# Patient Record
Sex: MALE | Race: BLACK OR AFRICAN AMERICAN | Employment: FULL TIME | URBAN - METROPOLITAN AREA
[De-identification: names, ages, dates, MRNs, and addresses within clinical notes are randomized per-mention and may not be internally consistent; named-entity substitution may affect disease eponyms.]

---

## 2022-05-02 ENCOUNTER — HOSPITAL ENCOUNTER (EMERGENCY)
Age: 42
Discharge: HOME | End: 2022-05-03
Payer: COMMERCIAL

## 2022-05-02 ENCOUNTER — APPOINTMENT (OUTPATIENT)
Dept: GENERAL RADIOLOGY | Age: 42
End: 2022-05-02
Payer: COMMERCIAL

## 2022-05-02 DIAGNOSIS — M25.562 ARTHRALGIA OF BOTH KNEES: Primary | ICD-10-CM

## 2022-05-02 DIAGNOSIS — L03.115 CELLULITIS OF RIGHT LOWER EXTREMITY: ICD-10-CM

## 2022-05-02 DIAGNOSIS — M25.561 ARTHRALGIA OF BOTH KNEES: Primary | ICD-10-CM

## 2022-05-02 LAB
ALBUMIN SERPL-MCNC: 4.2 G/DL (ref 3.4–5)
ALP BLD-CCNC: 89 U/L (ref 40–129)
ALT SERPL-CCNC: 19 U/L (ref 10–40)
ANION GAP SERPL CALCULATED.3IONS-SCNC: 17 MMOL/L (ref 3–16)
AST SERPL-CCNC: 16 U/L (ref 15–37)
BASOPHILS ABSOLUTE: 0.1 K/UL (ref 0–0.2)
BASOPHILS RELATIVE PERCENT: 0.6 %
BILIRUB SERPL-MCNC: 0.5 MG/DL (ref 0–1)
BILIRUBIN DIRECT: <0.2 MG/DL (ref 0–0.3)
BILIRUBIN, INDIRECT: NORMAL MG/DL (ref 0–1)
BUN BLDV-MCNC: 34 MG/DL (ref 7–20)
CALCIUM SERPL-MCNC: 9.9 MG/DL (ref 8.3–10.6)
CHLORIDE BLD-SCNC: 98 MMOL/L (ref 99–110)
CO2: 24 MMOL/L (ref 21–32)
CREAT SERPL-MCNC: 3.6 MG/DL (ref 0.9–1.3)
EOSINOPHILS ABSOLUTE: 0.2 K/UL (ref 0–0.6)
EOSINOPHILS RELATIVE PERCENT: 1.4 %
GFR AFRICAN AMERICAN: 23
GFR NON-AFRICAN AMERICAN: 19
GLUCOSE BLD-MCNC: 101 MG/DL (ref 70–99)
HCT VFR BLD CALC: 32.8 % (ref 40.5–52.5)
HEMOGLOBIN: 11.1 G/DL (ref 13.5–17.5)
LACTIC ACID, SEPSIS: 1.2 MMOL/L (ref 0.4–1.9)
LYMPHOCYTES ABSOLUTE: 1.5 K/UL (ref 1–5.1)
LYMPHOCYTES RELATIVE PERCENT: 14.1 %
MCH RBC QN AUTO: 30.7 PG (ref 26–34)
MCHC RBC AUTO-ENTMCNC: 33.9 G/DL (ref 31–36)
MCV RBC AUTO: 90.6 FL (ref 80–100)
MONOCYTES ABSOLUTE: 0.8 K/UL (ref 0–1.3)
MONOCYTES RELATIVE PERCENT: 7.1 %
NEUTROPHILS ABSOLUTE: 8.3 K/UL (ref 1.7–7.7)
NEUTROPHILS RELATIVE PERCENT: 76.8 %
PDW BLD-RTO: 14.2 % (ref 12.4–15.4)
PLATELET # BLD: 298 K/UL (ref 135–450)
PMV BLD AUTO: 8.3 FL (ref 5–10.5)
POTASSIUM REFLEX MAGNESIUM: 3.8 MMOL/L (ref 3.5–5.1)
RAPID INFLUENZA  B AGN: NEGATIVE
RAPID INFLUENZA A AGN: NEGATIVE
RBC # BLD: 3.62 M/UL (ref 4.2–5.9)
SEDIMENTATION RATE, ERYTHROCYTE: 109 MM/HR (ref 0–15)
SODIUM BLD-SCNC: 139 MMOL/L (ref 136–145)
TOTAL PROTEIN: 7.9 G/DL (ref 6.4–8.2)
URIC ACID, SERUM: 10.8 MG/DL (ref 3.5–7.2)
WBC # BLD: 10.8 K/UL (ref 4–11)

## 2022-05-02 PROCEDURE — 87804 INFLUENZA ASSAY W/OPTIC: CPT

## 2022-05-02 PROCEDURE — 85652 RBC SED RATE AUTOMATED: CPT

## 2022-05-02 PROCEDURE — U0005 INFEC AGEN DETEC AMPLI PROBE: HCPCS

## 2022-05-02 PROCEDURE — 96374 THER/PROPH/DIAG INJ IV PUSH: CPT

## 2022-05-02 PROCEDURE — 84550 ASSAY OF BLOOD/URIC ACID: CPT

## 2022-05-02 PROCEDURE — 2580000003 HC RX 258: Performed by: PHYSICIAN ASSISTANT

## 2022-05-02 PROCEDURE — 96375 TX/PRO/DX INJ NEW DRUG ADDON: CPT

## 2022-05-02 PROCEDURE — 99284 EMERGENCY DEPT VISIT MOD MDM: CPT

## 2022-05-02 PROCEDURE — 83605 ASSAY OF LACTIC ACID: CPT

## 2022-05-02 PROCEDURE — 80076 HEPATIC FUNCTION PANEL: CPT

## 2022-05-02 PROCEDURE — 71045 X-RAY EXAM CHEST 1 VIEW: CPT

## 2022-05-02 PROCEDURE — 87040 BLOOD CULTURE FOR BACTERIA: CPT

## 2022-05-02 PROCEDURE — 86140 C-REACTIVE PROTEIN: CPT

## 2022-05-02 PROCEDURE — 80048 BASIC METABOLIC PNL TOTAL CA: CPT

## 2022-05-02 PROCEDURE — 96361 HYDRATE IV INFUSION ADD-ON: CPT

## 2022-05-02 PROCEDURE — 6360000002 HC RX W HCPCS: Performed by: PHYSICIAN ASSISTANT

## 2022-05-02 PROCEDURE — 36415 COLL VENOUS BLD VENIPUNCTURE: CPT

## 2022-05-02 PROCEDURE — 85025 COMPLETE CBC W/AUTO DIFF WBC: CPT

## 2022-05-02 PROCEDURE — 81001 URINALYSIS AUTO W/SCOPE: CPT

## 2022-05-02 PROCEDURE — 6370000000 HC RX 637 (ALT 250 FOR IP): Performed by: PHYSICIAN ASSISTANT

## 2022-05-02 PROCEDURE — U0003 INFECTIOUS AGENT DETECTION BY NUCLEIC ACID (DNA OR RNA); SEVERE ACUTE RESPIRATORY SYNDROME CORONAVIRUS 2 (SARS-COV-2) (CORONAVIRUS DISEASE [COVID-19]), AMPLIFIED PROBE TECHNIQUE, MAKING USE OF HIGH THROUGHPUT TECHNOLOGIES AS DESCRIBED BY CMS-2020-01-R: HCPCS

## 2022-05-02 RX ORDER — METHYLPREDNISOLONE 4 MG/1
TABLET ORAL
Qty: 1 KIT | Refills: 0 | Status: SHIPPED | OUTPATIENT
Start: 2022-05-02 | End: 2022-06-24

## 2022-05-02 RX ORDER — CEPHALEXIN 500 MG/1
500 CAPSULE ORAL 2 TIMES DAILY
Qty: 14 CAPSULE | Refills: 0 | Status: SHIPPED | OUTPATIENT
Start: 2022-05-02 | End: 2022-05-09

## 2022-05-02 RX ORDER — MORPHINE SULFATE 4 MG/ML
4 INJECTION, SOLUTION INTRAMUSCULAR; INTRAVENOUS ONCE
Status: COMPLETED | OUTPATIENT
Start: 2022-05-02 | End: 2022-05-02

## 2022-05-02 RX ORDER — ACETAMINOPHEN 500 MG
1000 TABLET ORAL ONCE
Status: COMPLETED | OUTPATIENT
Start: 2022-05-02 | End: 2022-05-02

## 2022-05-02 RX ORDER — 0.9 % SODIUM CHLORIDE 0.9 %
1000 INTRAVENOUS SOLUTION INTRAVENOUS ONCE
Status: COMPLETED | OUTPATIENT
Start: 2022-05-02 | End: 2022-05-02

## 2022-05-02 RX ORDER — ONDANSETRON 2 MG/ML
4 INJECTION INTRAMUSCULAR; INTRAVENOUS ONCE
Status: COMPLETED | OUTPATIENT
Start: 2022-05-02 | End: 2022-05-02

## 2022-05-02 RX ADMIN — SODIUM CHLORIDE 1000 ML: 9 INJECTION, SOLUTION INTRAVENOUS at 21:33

## 2022-05-02 RX ADMIN — MORPHINE SULFATE 4 MG: 4 INJECTION, SOLUTION INTRAMUSCULAR; INTRAVENOUS at 21:34

## 2022-05-02 RX ADMIN — ACETAMINOPHEN 1000 MG: 500 TABLET ORAL at 21:36

## 2022-05-02 RX ADMIN — ONDANSETRON 4 MG: 2 INJECTION INTRAMUSCULAR; INTRAVENOUS at 21:34

## 2022-05-02 ASSESSMENT — PAIN DESCRIPTION - LOCATION
LOCATION: KNEE
LOCATION: KNEE

## 2022-05-02 ASSESSMENT — PAIN SCALES - GENERAL
PAINLEVEL_OUTOF10: 2
PAINLEVEL_OUTOF10: 1
PAINLEVEL_OUTOF10: 8
PAINLEVEL_OUTOF10: 8

## 2022-05-02 ASSESSMENT — PAIN DESCRIPTION - ORIENTATION
ORIENTATION: RIGHT;LEFT
ORIENTATION: LEFT;RIGHT

## 2022-05-02 ASSESSMENT — PAIN - FUNCTIONAL ASSESSMENT: PAIN_FUNCTIONAL_ASSESSMENT: 0-10

## 2022-05-03 VITALS
HEART RATE: 94 BPM | TEMPERATURE: 99.3 F | DIASTOLIC BLOOD PRESSURE: 76 MMHG | RESPIRATION RATE: 14 BRPM | BODY MASS INDEX: 31.54 KG/M2 | HEIGHT: 71 IN | OXYGEN SATURATION: 98 % | WEIGHT: 225.3 LBS | SYSTOLIC BLOOD PRESSURE: 120 MMHG

## 2022-05-03 LAB
BACTERIA: ABNORMAL /HPF
BILIRUBIN URINE: NEGATIVE
BLOOD, URINE: NEGATIVE
C-REACTIVE PROTEIN: 162.7 MG/L (ref 0–5.1)
CLARITY: CLEAR
COLOR: YELLOW
EPITHELIAL CELLS, UA: ABNORMAL /HPF (ref 0–5)
GLUCOSE URINE: NEGATIVE MG/DL
KETONES, URINE: NEGATIVE MG/DL
LEUKOCYTE ESTERASE, URINE: NEGATIVE
MICROSCOPIC EXAMINATION: YES
MUCUS: ABNORMAL /LPF
NITRITE, URINE: NEGATIVE
PH UA: 5.5 (ref 5–8)
PROTEIN UA: ABNORMAL MG/DL
RBC UA: ABNORMAL /HPF (ref 0–4)
SARS-COV-2, PCR: NOT DETECTED
SPECIFIC GRAVITY UA: 1.02 (ref 1–1.03)
URINE REFLEX TO CULTURE: ABNORMAL
URINE TYPE: ABNORMAL
UROBILINOGEN, URINE: 0.2 E.U./DL
WBC UA: ABNORMAL /HPF (ref 0–5)

## 2022-05-03 ASSESSMENT — PAIN SCALES - GENERAL: PAINLEVEL_OUTOF10: 1

## 2022-05-03 ASSESSMENT — PAIN - FUNCTIONAL ASSESSMENT: PAIN_FUNCTIONAL_ASSESSMENT: 0-10

## 2022-05-03 NOTE — ED PROVIDER NOTES
MidLevel Attestation   I havepersonally performed and/or participated in the history, exam and medical decision making and agree with all pertinent clinical information. I have also reviewed and agree with the past medical, family and social historyunless otherwise noted. I have personally performed a face to face diagnostic evaluation onthis patient. I have reviewed the mid-levels findings and agree. In brief, Jaime Armstrong is a 46 y.o. male that presented to the emergency department with   Chief Complaint   Patient presents with    Joint Pain     c/o bilateral knee pain. Left knee x 3 weeks. Right knee since friday. Also c/o back pain since sunday. .  CONSTITUTIONAL: Well appearing, in no acute distress   EYES: PERRL, No injection, discharge or scleral icterus. NECK: Normal ROM, NO LAD and Moist mucous membranes. CARDIOVASCULAR: Regular rate and rhythm. No murmurs or gallop. PULMONARY/CHEST: Airway patent. No retractions. Breath sounds clear with good air entry bilaterally. ABDOMEN: Soft, Non-distended and non-tender, without guarding or rebound. SKIN: Acyanotic, warm, dry   MUSCULOSKELETAL: Right knee was warm and tender to touch concerning for cellulitis  NEUROLOGICAL: Awake. Pulses intact. Grossly nonfocal     Who is presenting complaining of joint pain on both sides for the past 3 weeks with a fever. Patient stated that his symptoms have been similar to the past when he was diagnosed with pseudogout and not at that he had a fever. Patient presented with a fever and tachycardic. Septic protocol was initiated. Patient was treated for fever with IV fluids. Labs obtained with no significant findings no concerns of systemic infection. I believe that there is probably some inflammatory changes which could be gout versus pseudogout. Nonetheless patient is stable at this time no indication for admission.   He was given antibiotics for possible cellulitis of the knee because of the redness and warmth to touch and steroids for possible gout. Patient was discharged home in stable condition.     I have reviewed and interpreted all of the currently available lab results and diagnostics from this visit:  Results for orders placed or performed during the hospital encounter of 05/02/22   Rapid influenza A/B antigens    Specimen: Nasopharyngeal   Result Value Ref Range    Rapid Influenza A Ag Negative Negative    Rapid Influenza B Ag Negative Negative   CBC with Auto Differential   Result Value Ref Range    WBC 10.8 4.0 - 11.0 K/uL    RBC 3.62 (L) 4.20 - 5.90 M/uL    Hemoglobin 11.1 (L) 13.5 - 17.5 g/dL    Hematocrit 32.8 (L) 40.5 - 52.5 %    MCV 90.6 80.0 - 100.0 fL    MCH 30.7 26.0 - 34.0 pg    MCHC 33.9 31.0 - 36.0 g/dL    RDW 14.2 12.4 - 15.4 %    Platelets 031 959 - 241 K/uL    MPV 8.3 5.0 - 10.5 fL    Neutrophils % 76.8 %    Lymphocytes % 14.1 %    Monocytes % 7.1 %    Eosinophils % 1.4 %    Basophils % 0.6 %    Neutrophils Absolute 8.3 (H) 1.7 - 7.7 K/uL    Lymphocytes Absolute 1.5 1.0 - 5.1 K/uL    Monocytes Absolute 0.8 0.0 - 1.3 K/uL    Eosinophils Absolute 0.2 0.0 - 0.6 K/uL    Basophils Absolute 0.1 0.0 - 0.2 K/uL   Basic Metabolic Panel w/ Reflex to MG   Result Value Ref Range    Sodium 139 136 - 145 mmol/L    Potassium reflex Magnesium 3.8 3.5 - 5.1 mmol/L    Chloride 98 (L) 99 - 110 mmol/L    CO2 24 21 - 32 mmol/L    Anion Gap 17 (H) 3 - 16    Glucose 101 (H) 70 - 99 mg/dL    BUN 34 (H) 7 - 20 mg/dL    CREATININE 3.6 (H) 0.9 - 1.3 mg/dL    GFR Non- 19 (A) >60    GFR  23 (A) >60    Calcium 9.9 8.3 - 10.6 mg/dL   Uric Acid   Result Value Ref Range    Uric Acid, Serum 10.8 (H) 3.5 - 7.2 mg/dL   Urinalysis with Reflex to Culture    Specimen: Urine, clean catch   Result Value Ref Range    Color, UA Yellow Straw/Yellow    Clarity, UA Clear Clear    Glucose, Ur Negative Negative mg/dL    Bilirubin Urine Negative Negative    Ketones, Urine Negative Negative mg/dL Specific Gravity, UA 1.020 1.005 - 1.030    Blood, Urine Negative Negative    pH, UA 5.5 5.0 - 8.0    Protein, UA TRACE (A) Negative mg/dL    Urobilinogen, Urine 0.2 <2.0 E.U./dL    Nitrite, Urine Negative Negative    Leukocyte Esterase, Urine Negative Negative    Microscopic Examination YES     Urine Type NotGiven     Urine Reflex to Culture Not Indicated    Sedimentation Rate   Result Value Ref Range    Sed Rate 109 (H) 0 - 15 mm/Hr   C-Reactive Protein   Result Value Ref Range    .7 (H) 0.0 - 5.1 mg/L   Hepatic Function Panel   Result Value Ref Range    Total Protein 7.9 6.4 - 8.2 g/dL    Albumin 4.2 3.4 - 5.0 g/dL    Alkaline Phosphatase 89 40 - 129 U/L    ALT 19 10 - 40 U/L    AST 16 15 - 37 U/L    Total Bilirubin 0.5 0.0 - 1.0 mg/dL    Bilirubin, Direct <0.2 0.0 - 0.3 mg/dL    Bilirubin, Indirect see below 0.0 - 1.0 mg/dL   Lactate, Sepsis   Result Value Ref Range    Lactic Acid, Sepsis 1.2 0.4 - 1.9 mmol/L   Microscopic Urinalysis   Result Value Ref Range    Mucus, UA Rare (A) None Seen /LPF    WBC, UA 0-2 0 - 5 /HPF    RBC, UA 0-2 0 - 4 /HPF    Epithelial Cells, UA 0-1 0 - 5 /HPF    Bacteria, UA 2+ (A) None Seen /HPF   COVID-19   Result Value Ref Range    SARS-CoV-2, PCR Not Detected Not Detected     No results found.     ED Medication Orders (From admission, onward)    Start Ordered     Status Ordering Provider    05/02/22 2100 05/02/22 2050  0.9 % sodium chloride bolus  ONCE         Last MAR action: Stopped - by CRISTIAN RUTLEDGE on 05/02/22 at [address] C    05/02/22 2100 05/02/22 2050  acetaminophen (TYLENOL) tablet 1,000 mg  ONCE         Last MAR action: Given - by Joaquin Villegas on 05/02/22 at [address] C    05/02/22 2100 05/02/22 2054  morphine (PF) injection 4 mg  ONCE         Last MAR action: Given - by Joaquin Villegas on 05/02/22 at [address]    05/02/22 2100 05/02/22 2054  ondansetron (ZOFRAN) injection 4 mg  ONCE         Last MAR action: Given - by LAUREN, Brenden Ashby on 05/02/22 at [address]          Final Impression      1. Arthralgia of both knees    2. Cellulitis of right lower extremity        DISPOSITION Decision To Discharge 05/02/2022 11:53:22 PM       This record is transcribed utilizing voice recognition technology. There are inherent limitations in this technology. In addition, there may be limitations in editing of this report. If there are any discrepancies, please contact me directly.     Javier Dean MD   5/4/2022         Javier Dean MD  05/04/22 5969

## 2022-05-03 NOTE — ED PROVIDER NOTES
[facility] ENCOUNTER        Pt Name: Bebo Palomino  MRN: [id number]  [place] 1980  Date of evaluation: 5/2/2022  Provider: Chelsea Horvath PA-C  PCP: No primary care provider on file. Note Started: 9:06 PM EDT       I have seen and evaluated this patient with my supervising physician Dr. Sharon Montoya   Patient presents with    Joint Pain     c/o bilateral knee pain. Left knee x 3 weeks. Right knee since friday. Also c/o back pain since sunday. HISTORY OF PRESENT ILLNESS   (Location/Symptom, Timing/Onset, Context/Setting, Quality, Duration, Modifying Factors, Severity)  Note limiting factors. Chief Complaint: Knees hurting    Bebo Palomino is a 46 y.o. male who presents indicating that his left knee started hurting worse on Friday and then his right knee soon followed. Both of them are swollen and tender. He states that he might have gout because he has had it before in his toe. However he states that he has some other issues going on. He sees a cardiologist and has a history of heart disease. He has seen his family doctor and also has a history of advancing kidney disease. He has not seen the nephrologist yet. Really the left knee has been bothering him for a number of weeks at this time. He states that while the cardiologist started him on a diuretic, he also did discontinue it. The family doctor refilled it but since the cardiologist was concerned about his kidney function the patient did not started again. He states that he has been using a \"natural\" kidney cleanser that one of his friends indicated to him should be okay in spite of his kidney disease.   Patient states that he very frequently over the course of the last several months has had night sweats and really has not noticed the fever today as being anything different than usual.  He denies any real runny or stuffy nose or cough or congestion or abdominal pain or burning in urination or acute stool change or extremity acute weakness. He states his knees really are the worst issues on the right 1 is worse with bending it and better at rest achy constant 8 out of 10. Patient's mother, also at patient's bedside, indicates that he has been moving recently and in and out of a moving truck which may have gotten things aggravated as well. Patient is not currently on any medication for gout and is not on a diuretic. He has not been immunized for influenza or COVID at all. Nursing Notes were all reviewed and agreed with or any disagreements were addressed in the HPI. REVIEW OF SYSTEMS    (2-9 systems for level 4, 10 or more for level 5)     Review of Systems  Positive for the night sweats which patient states are not atypical or abnormal for him at this point but no noticed fevers at home, generally feeling worse over the last 3 days or so since Friday with increasing swelling and pain and tightness in the bilateral knees first the left and then more into the right at this time with no extremity acute weakness or loss of sensation or coordination. Positive for potential overuse type injuries aggravating the knees with moving recently and using a moving truck. No shortness of breath or chest pain or heart palpitations dizziness confusion syncope or near syncope. No abdominal pain or distention or difficulty eating or drinking. No acute urine or stool changes noted by patient and her headache or neck pain or stiffness. PAST MEDICAL HISTORY   History reviewed. No pertinent past medical history. SURGICAL HISTORY   History reviewed. No pertinent surgical history. CURRENTMEDICATIONS       Previous Medications    No medications on file       ALLERGIES     Patient has no known allergies. FAMILYHISTORY     History reviewed. No pertinent family history.      SOCIAL HISTORY       Social History     Socioeconomic History    Marital status: Single     Spouse name: None    Number of children: None    Years of education: None    Highest education level: None   Occupational History    None   Tobacco Use    Smoking status: Never Smoker    Smokeless tobacco: None   Substance and Sexual Activity    Alcohol use: Never    Drug use: None    Sexual activity: None   Other Topics Concern    None   Social History Narrative    None     Social Determinants of Health     Financial Resource Strain:     Difficulty of Paying Living Expenses: Not on file   Food Insecurity:     Worried About Running Out of Food in the Last Year: Not on file    Marilee of Food in the Last Year: Not on file   Transportation Needs:     Lack of Transportation (Medical): Not on file    Lack of Transportation (Non-Medical):  Not on file   Physical Activity:     Days of Exercise per Week: Not on file    Minutes of Exercise per Session: Not on file   Stress:     Feeling of Stress : Not on file   Social Connections:     Frequency of Communication with Friends and Family: Not on file    Frequency of Social Gatherings with Friends and Family: Not on file    Attends Christian Services: Not on file    Active Member of [address] [place] [address] or Organizations: Not on file    Attends Club or Organization Meetings: Not on file    Marital Status: Not on file   Intimate Partner Violence:     Fear of Current or Ex-Partner: Not on file    Emotionally Abused: Not on file    Physically Abused: Not on file    Sexually Abused: Not on file   Housing Stability:     Unable to Pay for Housing in the Last Year: Not on file    Number of [place] in the Last Year: Not on file    Unstable Housing in the Last Year: Not on file       SCREENINGS    Rissa Coma Scale  Eye Opening: Spontaneous  Best Verbal Response: Oriented  Best Motor Response: Obeys commands  Rissa Coma Scale Score: 15        PHYSICAL EXAM    (up to 7 for level 4, 8 or more for level 5)     ED Triage Vitals [05/02/22 2025]   BP Temp Temp Source Pulse Resp SpO2 Height Weight   137/83 101.2 °F (38.4 °C) Oral 116 18 98 % 5' 11\" (1.803 m) 225 lb 4.8 oz (102.2 kg)       Physical Exam  Vitals and nursing note reviewed. Constitutional:       Appearance: Normal appearance. He is not diaphoretic. Comments: Patient does not look particularly stressed, cracking jokes at the bedside and giving his history well   HENT:      Head: Normocephalic and atraumatic. Right Ear: External ear normal.      Left Ear: External ear normal.      Nose: Nose normal.      Mouth/Throat:      Mouth: Mucous membranes are moist.      Comments: Gag reflex intact  Eyes:      General:         Right eye: No discharge. Left eye: No discharge. Conjunctiva/sclera: Conjunctivae normal.   Cardiovascular:      Rate and Rhythm: Regular rhythm. Tachycardia present. Pulses: Normal pulses. Heart sounds: Normal heart sounds. No murmur heard. No gallop. Pulmonary:      Effort: Pulmonary effort is normal. No respiratory distress. Breath sounds: Normal breath sounds. No wheezing, rhonchi or rales. Abdominal:      General: Bowel sounds are normal. There is no distension. Palpations: Abdomen is soft. Tenderness: There is no abdominal tenderness. There is no guarding or rebound. Musculoskeletal:         General: Swelling and tenderness present. Cervical back: Normal range of motion and neck supple. Right lower leg: No edema. Left lower leg: Edema present. Comments: Right and left knees both mildly edematous, not globally hot or red comparatively, however tender with movement and better at rest.  Distal pulses 2+ bilaterally in radialis as well as dorsalis pedis. Capillary fill brisk less than 1 second throughout. Skin:     General: Skin is warm and dry. Capillary Refill: Capillary refill takes less than 2 seconds. Findings: No rash.    Neurological:      Mental Status: He is alert and oriented to person, place, and time. Mental status is at baseline. Motor: No weakness. Coordination: Coordination normal.   Psychiatric:         Mood and Affect: Mood normal.         Behavior: Behavior normal.         DIAGNOSTIC RESULTS   LABS:    Labs Reviewed   RAPID INFLUENZA A/B ANTIGENS   CULTURE, BLOOD 1   CULTURE, BLOOD 2   COVID-19   CBC WITH AUTO DIFFERENTIAL   BASIC METABOLIC PANEL W/ REFLEX TO MG FOR LOW K   URIC ACID   URINALYSIS WITH REFLEX TO CULTURE   COVID-19   COVID-19   COVID-19   SEDIMENTATION RATE   C-REACTIVE PROTEIN   HEPATIC FUNCTION PANEL   LACTATE, SEPSIS   LACTATE, SEPSIS       When ordered, only abnormal lab results are displayed. All other labs were within normal range or not returned as of this dictation. EKG: When ordered, EKG's are interpreted by the Emergency Department Physician in the absence of a cardiologist.  Please see their note for interpretation of EKG. RADIOLOGY:   Non-plain film images such as CT, Ultrasound and MRI are read by the radiologist. Plain radiographic images are visualized andpreliminarily interpreted by the  ED Provider with the below findings:        Interpretation perthe Radiologist below, if available at the time of this note:    XR CHEST PORTABLE    (Results Pending)     No results found.       PROCEDURES   Unless otherwise noted below, none     Procedures    CRITICAL CARE TIME   N/A    CONSULTS:  None      EMERGENCY DEPARTMENT COURSE and DIFFERENTIAL DIAGNOSIS/MDM:   Vitals:    Vitals:    05/02/22 2025   BP: 137/83   Pulse: 116   Resp: 18   Temp: 101.2 °F (38.4 °C)   TempSrc: Oral   SpO2: 98%   Weight: 225 lb 4.8 oz (102.2 kg)   Height: 5' 11\" (1.803 m)       Patient was given thefollowing medications:  Medications   0.9 % sodium chloride bolus (has no administration in time range)   acetaminophen (TYLENOL) tablet 1,000 mg (has no administration in time range)   morphine (PF) injection 4 mg (has no administration in time range)   ondansetron [facility]) injection 4 mg (has no administration in time range)         This patient presents as above and evaluation and treatment is begun. It is discussed the patient that with his fever and tachycardia, history of heart disease and CHF, reported gout, and nonimmunized status in terms of COVID and flow, that there are a lot of caution signs for him and that some IV medications are indicated as well as work-up to begin. He indicates he is willing to do this. Initial testing when compared to patient's baseline kidney function shows BUN and creatinine 34 and 3.6 today whereas about 2 months ago the numbers were 30 and 3.7, very similar. Patient is given a bolus over 2 hours of a liter of normal saline in hopes of beginning to address his suspected dehydration. Uric acid level for the patient is elevated at 10.8. Lactic acid initially not elevated. Sed rate is 109. Rapid flu is negative. CBC without leukocytosis. 10:19 PM  Patient's work-up and treatment is still in process. Dr. Lisy Santamaria has been the supervising physician, has evaluated the patient and contributed to work-up to this point, and is continuing to provide care and treatment for the patient. Please see his notes for further course in the emergency department as well as final impression and disposition.   Thank you           (Please note that portions ofthis note were completed with a voice recognition program.  Efforts were made to edit the dictations but occasionally words are mis-transcribed.)    Sade Swan PA-C (electronically signed)             Sade Swan PA-C  05/02/22 2607

## 2022-05-07 LAB
BLOOD CULTURE, ROUTINE: NORMAL
CULTURE, BLOOD 2: NORMAL

## 2022-05-11 ENCOUNTER — OFFICE VISIT (OUTPATIENT)
Dept: ORTHOPEDIC SURGERY | Age: 42
End: 2022-05-11
Payer: COMMERCIAL

## 2022-05-11 VITALS — HEIGHT: 71 IN | BODY MASS INDEX: 31.5 KG/M2 | WEIGHT: 225 LBS

## 2022-05-11 DIAGNOSIS — M17.0 PRIMARY OSTEOARTHRITIS OF BOTH KNEES: Primary | ICD-10-CM

## 2022-05-11 DIAGNOSIS — R52 PAIN: ICD-10-CM

## 2022-05-11 PROCEDURE — 99203 OFFICE O/P NEW LOW 30 MIN: CPT | Performed by: ORTHOPAEDIC SURGERY

## 2022-05-11 PROCEDURE — 20610 DRAIN/INJ JOINT/BURSA W/O US: CPT | Performed by: ORTHOPAEDIC SURGERY

## 2022-05-11 RX ORDER — TRIAMCINOLONE ACETONIDE 40 MG/ML
40 INJECTION, SUSPENSION INTRA-ARTICULAR; INTRAMUSCULAR ONCE
Status: DISCONTINUED | OUTPATIENT
Start: 2022-05-11 | End: 2022-06-24

## 2022-05-11 RX ORDER — LIDOCAINE HYDROCHLORIDE 10 MG/ML
40 INJECTION, SOLUTION INFILTRATION; PERINEURAL ONCE
Status: DISCONTINUED | OUTPATIENT
Start: 2022-05-11 | End: 2022-06-24

## 2022-05-11 NOTE — PROGRESS NOTES
CHIEF COMPLAINT: Bilateral knee pain/ osteoarthritis. HISTORY:  Mr. Gina Olivo 46 y.o.  male presents today for the first visit for evaluation of bilateral knee pain which started to worsen in April 2022 with no injury or trauma. He is complaining of achy pain. Pain is increase with standing and walking and decrease with rest.  Rates pain a 6/10 VAS but does increase to a 9/10 VAS when walking. Pain is sharp early in the morning with first few steps, dull achy pain by the end of the day. Alleviating factors: rest. No radiation and no numbness and tingling sensation. No other complaint. He states he is recently lost more than 120 pounds over the past year. He is a type II diabetic. No h/o trauma or gout. He was recently in [facility] with complaints of bilateral knee pain and was referred to orthopedics. Denies smoking. History reviewed. No pertinent past medical history. History reviewed. No pertinent surgical history. Social History     Socioeconomic History    Marital status: Single     Spouse name: Not on file    Number of children: Not on file    Years of education: Not on file    Highest education level: Not on file   Occupational History    Not on file   Tobacco Use    Smoking status: Never Smoker    Smokeless tobacco: Never Used   Vaping Use    Vaping Use: Never used   Substance and Sexual Activity    Alcohol use: Never    Drug use: Never    Sexual activity: Not on file   Other Topics Concern    Not on file   Social History Narrative    Not on file     Social Determinants of Health     Financial Resource Strain:     Difficulty of Paying Living Expenses: Not on file   Food Insecurity:     Worried About [address] in the Last Year: Not on file    Marilee of Food in the Last Year: Not on file   Transportation Needs:     Lack of Transportation (Medical): Not on file    Lack of Transportation (Non-Medical):  Not on file   Physical Activity:     Days of Exercise per Week: Not on file    Minutes of Exercise per Session: Not on file   Stress:     Feeling of Stress : Not on file   Social Connections:     Frequency of Communication with Friends and Family: Not on file    Frequency of Social Gatherings with Friends and Family: Not on file    Attends Sikhism Services: Not on file    Active Member of Clubs or Organizations: Not on file    Attends Club or Organization Meetings: Not on file    Marital Status: Not on file   Intimate Partner Violence:     Fear of Current or Ex-Partner: Not on file    Emotionally Abused: Not on file    Physically Abused: Not on file    Sexually Abused: Not on file   Housing Stability:     Unable to Pay for Housing in the Last Year: Not on file    Number of [place] in the Last Year: Not on file    Unstable Housing in the Last Year: Not on file       History reviewed. No pertinent family history. Current Outpatient Medications on File Prior to Visit   Medication Sig Dispense Refill    methylPREDNISolone (MEDROL, JONATAN,) 4 MG tablet Take by mouth. (Patient not taking: Reported on 5/11/2022) 1 kit 0     No current facility-administered medications on file prior to visit. Pertinent items are noted in HPI  Review of systems reviewed from Patient History Form and available in the patient's chart under the Media tab. PHYSICAL EXAMINATION:  Mr. Anitha Kirkland is a very pleasant 46 y.o.  male who presents today in no acute distress, awake, alert, and oriented. He is well dressed, nourished and  groomed. Patient with normal affect. Height is  5' 11\" (1.803 m), weight is 225 lb (102.1 kg), Body mass index is 31.38 kg/m². Resting respiratory rate is 16. Examination of the gait, showed that the patient walks heel-toe with a non-antalgic gait and no limp.   Examination of both knees showing full ROM, bilateral mild crepitus, tenderness on medial joint line, stable to varus and valgus stress. He has intact sensation and good pedal pulses. He has good strength in 2 planes, and has mild tenderness on deep palpation over the medial joint line. Knee reflex 1+ bilaterally. IMAGING:  Xray 3 views of the bilateral knee was obtained today in the office and reviewed. These demonstrate moderate degenerative changes with narrowing of the joint space in the medial joint space compartment, subchondral sclerosis, and marginal osteophytosis. IMPRESSION: Bilateral knee DJD. PLAN: I discussed with the patient the treatment options including both surgical and non-surgical treatment. We recommended Quad exercises and stretching of the calf and hamstrings which was taught to the patient today. He will take NSAIDS as needed. I believe he will benefit from cortisone injection bilateral knee, and he agreed to have. PROCEDURE:    With the patient's permission, and under sterile condition, the bilateral knee was prepared and draped with alcohol and injected with a mixture of 1 mL Kenalog 40mg and 4 mL of 1% lidocaine through the medial parapatellar port area. The patient tolerated the procedure well. A band-aid was applied and the patient was advised to ice the knee for 15-20 minutes to relieve any injection site related pain. He reported a good improvement immediatly after the injection. F/u in 3-4 months, PT if needed. He understands that this may need surgery if the pain did not to resolve.        Too Briggs MD

## 2022-06-23 SDOH — HEALTH STABILITY: PHYSICAL HEALTH: ON AVERAGE, HOW MANY MINUTES DO YOU ENGAGE IN EXERCISE AT THIS LEVEL?: 0 MIN

## 2022-06-23 SDOH — HEALTH STABILITY: PHYSICAL HEALTH: ON AVERAGE, HOW MANY DAYS PER WEEK DO YOU ENGAGE IN MODERATE TO STRENUOUS EXERCISE (LIKE A BRISK WALK)?: 0 DAYS

## 2022-06-24 ENCOUNTER — OFFICE VISIT (OUTPATIENT)
Dept: PRIMARY CARE CLINIC | Age: 42
End: 2022-06-24
Payer: COMMERCIAL

## 2022-06-24 VITALS
HEART RATE: 103 BPM | TEMPERATURE: 97 F | WEIGHT: 232.2 LBS | SYSTOLIC BLOOD PRESSURE: 129 MMHG | BODY MASS INDEX: 32.51 KG/M2 | HEIGHT: 71 IN | DIASTOLIC BLOOD PRESSURE: 89 MMHG

## 2022-06-24 DIAGNOSIS — Z00.00 ANNUAL PHYSICAL EXAM: ICD-10-CM

## 2022-06-24 DIAGNOSIS — M10.9 ACUTE GOUT INVOLVING TOE OF RIGHT FOOT, UNSPECIFIED CAUSE: Primary | ICD-10-CM

## 2022-06-24 DIAGNOSIS — Z11.59 ENCOUNTER FOR HEPATITIS C SCREENING TEST FOR LOW RISK PATIENT: ICD-10-CM

## 2022-06-24 DIAGNOSIS — M10.9 ACUTE GOUT INVOLVING TOE OF RIGHT FOOT, UNSPECIFIED CAUSE: ICD-10-CM

## 2022-06-24 DIAGNOSIS — Z11.4 ENCOUNTER FOR SCREENING FOR HUMAN IMMUNODEFICIENCY VIRUS (HIV): ICD-10-CM

## 2022-06-24 DIAGNOSIS — Z95.810 IMPLANTABLE CARDIOVERTER-DEFIBRILLATOR (ICD) IN SITU: ICD-10-CM

## 2022-06-24 DIAGNOSIS — E87.6 HYPOKALEMIA: ICD-10-CM

## 2022-06-24 DIAGNOSIS — I10 BENIGN ESSENTIAL HYPERTENSION: ICD-10-CM

## 2022-06-24 DIAGNOSIS — I50.9 CHRONIC CONGESTIVE HEART FAILURE, UNSPECIFIED HEART FAILURE TYPE (HCC): ICD-10-CM

## 2022-06-24 DIAGNOSIS — E11.69 TYPE 2 DIABETES MELLITUS WITH OTHER SPECIFIED COMPLICATION, WITHOUT LONG-TERM CURRENT USE OF INSULIN (HCC): ICD-10-CM

## 2022-06-24 DIAGNOSIS — E66.09 CLASS 1 OBESITY DUE TO EXCESS CALORIES WITH SERIOUS COMORBIDITY IN ADULT, UNSPECIFIED BMI: ICD-10-CM

## 2022-06-24 PROBLEM — N19 RENAL FAILURE: Status: RESOLVED | Noted: 2022-03-08 | Resolved: 2022-06-24

## 2022-06-24 PROBLEM — G47.33 MODERATE OBSTRUCTIVE SLEEP APNEA: Status: ACTIVE | Noted: 2020-10-22

## 2022-06-24 PROBLEM — N19 RENAL FAILURE: Status: ACTIVE | Noted: 2022-03-08

## 2022-06-24 PROBLEM — E11.9 TYPE 2 DIABETES MELLITUS (HCC): Status: ACTIVE | Noted: 2018-01-01

## 2022-06-24 LAB
BASOPHILS ABSOLUTE: 0.1 K/UL (ref 0–0.2)
BASOPHILS RELATIVE PERCENT: 0.9 %
EOSINOPHILS ABSOLUTE: 0.1 K/UL (ref 0–0.6)
EOSINOPHILS RELATIVE PERCENT: 0.8 %
HCT VFR BLD CALC: 33 % (ref 40.5–52.5)
HEMOGLOBIN: 10.9 G/DL (ref 13.5–17.5)
LYMPHOCYTES ABSOLUTE: 2.7 K/UL (ref 1–5.1)
LYMPHOCYTES RELATIVE PERCENT: 23.3 %
MCH RBC QN AUTO: 29.5 PG (ref 26–34)
MCHC RBC AUTO-ENTMCNC: 33 G/DL (ref 31–36)
MCV RBC AUTO: 89.1 FL (ref 80–100)
MONOCYTES ABSOLUTE: 0.5 K/UL (ref 0–1.3)
MONOCYTES RELATIVE PERCENT: 4.5 %
NEUTROPHILS ABSOLUTE: 8.3 K/UL (ref 1.7–7.7)
NEUTROPHILS RELATIVE PERCENT: 70.5 %
PDW BLD-RTO: 15.7 % (ref 12.4–15.4)
PLATELET # BLD: 278 K/UL (ref 135–450)
PMV BLD AUTO: 8.5 FL (ref 5–10.5)
RBC # BLD: 3.71 M/UL (ref 4.2–5.9)
WBC # BLD: 11.7 K/UL (ref 4–11)

## 2022-06-24 PROCEDURE — 90746 HEPB VACCINE 3 DOSE ADULT IM: CPT | Performed by: STUDENT IN AN ORGANIZED HEALTH CARE EDUCATION/TRAINING PROGRAM

## 2022-06-24 PROCEDURE — 99204 OFFICE O/P NEW MOD 45 MIN: CPT | Performed by: STUDENT IN AN ORGANIZED HEALTH CARE EDUCATION/TRAINING PROGRAM

## 2022-06-24 PROCEDURE — 90732 PPSV23 VACC 2 YRS+ SUBQ/IM: CPT | Performed by: STUDENT IN AN ORGANIZED HEALTH CARE EDUCATION/TRAINING PROGRAM

## 2022-06-24 PROCEDURE — 90715 TDAP VACCINE 7 YRS/> IM: CPT | Performed by: STUDENT IN AN ORGANIZED HEALTH CARE EDUCATION/TRAINING PROGRAM

## 2022-06-24 PROCEDURE — 90471 IMMUNIZATION ADMIN: CPT | Performed by: STUDENT IN AN ORGANIZED HEALTH CARE EDUCATION/TRAINING PROGRAM

## 2022-06-24 PROCEDURE — 90472 IMMUNIZATION ADMIN EACH ADD: CPT | Performed by: STUDENT IN AN ORGANIZED HEALTH CARE EDUCATION/TRAINING PROGRAM

## 2022-06-24 RX ORDER — ATORVASTATIN CALCIUM 10 MG/1
10 TABLET, FILM COATED ORAL DAILY
COMMUNITY
Start: 2022-03-13

## 2022-06-24 RX ORDER — BUMETANIDE 1 MG/1
TABLET ORAL
COMMUNITY
Start: 2021-11-11 | End: 2022-06-24

## 2022-06-24 RX ORDER — POTASSIUM CHLORIDE 750 MG/1
10 TABLET, EXTENDED RELEASE ORAL DAILY
Qty: 30 TABLET | Refills: 11 | Status: SHIPPED | OUTPATIENT
Start: 2022-06-24

## 2022-06-24 RX ORDER — POTASSIUM CHLORIDE 750 MG/1
10 TABLET, EXTENDED RELEASE ORAL DAILY
Qty: 30 TABLET | Refills: 5
Start: 2022-06-24 | End: 2022-06-24 | Stop reason: SDUPTHER

## 2022-06-24 RX ORDER — BUMETANIDE 1 MG/1
TABLET ORAL
COMMUNITY
Start: 2022-04-27 | End: 2022-06-24 | Stop reason: SDUPTHER

## 2022-06-24 RX ORDER — CARVEDILOL 25 MG/1
TABLET ORAL
COMMUNITY
Start: 2022-03-08 | End: 2022-06-24

## 2022-06-24 RX ORDER — PREDNISONE 10 MG/1
TABLET ORAL
Qty: 40 TABLET | Refills: 0 | Status: SHIPPED | OUTPATIENT
Start: 2022-06-24 | End: 2022-07-10

## 2022-06-24 RX ORDER — CARVEDILOL 3.12 MG/1
TABLET ORAL
COMMUNITY
Start: 2022-05-25

## 2022-06-24 RX ORDER — ATORVASTATIN CALCIUM 10 MG/1
TABLET, FILM COATED ORAL
COMMUNITY
Start: 2022-05-25 | End: 2022-06-24

## 2022-06-24 RX ORDER — BUMETANIDE 1 MG/1
1 TABLET ORAL DAILY
Qty: 30 TABLET | Refills: 11 | Status: SHIPPED | OUTPATIENT
Start: 2022-06-24 | End: 2022-07-29 | Stop reason: SDUPTHER

## 2022-06-24 RX ORDER — BUMETANIDE 1 MG/1
TABLET ORAL
COMMUNITY
Start: 2022-04-27 | End: 2022-06-24

## 2022-06-24 ASSESSMENT — PATIENT HEALTH QUESTIONNAIRE - PHQ9
SUM OF ALL RESPONSES TO PHQ9 QUESTIONS 1 & 2: 0
1. LITTLE INTEREST OR PLEASURE IN DOING THINGS: 0
SUM OF ALL RESPONSES TO PHQ QUESTIONS 1-9: 0
SUM OF ALL RESPONSES TO PHQ QUESTIONS 1-9: 0
2. FEELING DOWN, DEPRESSED OR HOPELESS: 0
SUM OF ALL RESPONSES TO PHQ QUESTIONS 1-9: 0
SUM OF ALL RESPONSES TO PHQ QUESTIONS 1-9: 0

## 2022-06-24 ASSESSMENT — ENCOUNTER SYMPTOMS
NAUSEA: 0
WHEEZING: 0
SHORTNESS OF BREATH: 0

## 2022-06-24 NOTE — PROGRESS NOTES
[facility]  Establish care visit   2022    Aretha Wood (:  1980) is a 46 y.o. male, here to establish care. Chief Complaint   Patient presents with   [address]     wants bloodwork and urine test done         ASSESSMENT/ PLAN  1. Acute gout involving toe of right foot, unspecified cause  Uncontrolled, check labs. Initiate prednisone taper. Start allopurinol at follow-up appointment in 2 weeks. - CBC with Auto Differential; Future  - Uric Acid; Future  - Sedimentation Rate; Future  - C-Reactive Protein; Future  - predniSONE (DELTASONE) 10 MG tablet; Take 4 tablets by mouth daily for 4 days, THEN 3 tablets daily for 4 days, THEN 2 tablets daily for 4 days, THEN 1 tablet daily for 4 days. Dispense: 40 tablet; Refill: 0    2. Encounter for hepatitis C screening test for low risk patient  Screen  - Hepatitis C Antibody; Future    3. Encounter for screening for human immunodeficiency virus (HIV)  Screen  - HIV Screen; Future    4. Hypokalemia  Secondary to chronic Bumex use. Refilled  - potassium chloride (KLOR-CON M) 10 MEQ extended release tablet; Take 1 tablet by mouth daily  Dispense: 30 tablet; Refill: 11    5. Type 2 diabetes mellitus with other specified complication, without long-term current use of insulin (HCC)  Unknown A1c, recheck today. Continue Lipitor.  - MICROALBUMIN / CREATININE URINE RATIO; Future    6. Benign essential hypertension  Controlled, continue carvedilol    7. Class 1 obesity due to excess calories with serious comorbidity in adult, unspecified BMI  Complicates all aspects of patient's care    8. Chronic congestive heart failure, unspecified heart failure type ([address]  Unknown EF. Continue Bumex, carvedilol and Lipitor. Referral placed for cardiology  - Charlie Velásquez MD, Cardiology, [facility]  - bumetanide (BUMEX) 1 MG tablet; Take 1 tablet by mouth daily  Dispense: 30 tablet; Refill: 11    9.  Implantable cardioverter-defibrillator (ICD) in situ  Per above  - Jordan Palencia MD, Cardiology, [facility]    10. Annual physical exam  Screening labs ordered for follow-up physical  - Comprehensive Metabolic Panel; Future  - Hemoglobin A1C; Future  - Lipid Panel; Future       Return in about 2 weeks (around 7/8/2022) for gout, diabetes follow up. HPI  Patient presents today to establish care with concerns of gout, CHF with ICD placement, obesity, diabetes, hypertension, medication refills. Patient recently relocated from [place] to work for [employer]. Notes longstanding cardiac pathology starting with congenital cardiomegaly. He has followed closely with cardiology, last echocardiogram was last year. He cannot recall what his previous EF was, and record is not available in Care Everywhere. He has been adherent to his Bumex, carvedilol, and Lipitor. Denies chest pain, shortness of breath, palpitations or lower extremity swelling. States that he has had several gout attacks of his right first metatarsal, ankle. Most recent exacerbation began this week. He is not on gout prophylaxis. No injury to the area. Endorses weight loss with lifestyle changes, and has discontinued his metformin and Trulicity. Due for A1c recheck today. ROS  Review of Systems   Constitutional: Negative for activity change and unexpected weight change. HENT: Negative for tinnitus. Eyes: Negative for visual disturbance. Respiratory: Negative for shortness of breath and wheezing. Cardiovascular: Negative for chest pain, palpitations and leg swelling. Gastrointestinal: Negative for nausea. Genitourinary: Negative for decreased urine volume. Neurological: Negative for syncope, light-headedness and headaches.         HISTORIES  Current Outpatient Medications on File Prior to Visit   Medication Sig Dispense Refill    atorvastatin (LIPITOR) 10 MG tablet Take 10 mg by mouth daily      carvedilol (COREG) 3.125 MG tablet TAKE 1 TABLET BY MOUTH TWICE DAILY       No current facility-administered medications on file prior to visit. Allergies   Allergen Reactions    Eucalyptus Oil Rash       Past Medical History:   Diagnosis Date    Arthritis     Congestive heart failure (CHF) (HCC)     Diabetes (HCC)     Gout     HTN (hypertension)        Patient Active Problem List   Diagnosis    Primary osteoarthritis of both knees    Benign essential hypertension    Cardiomyopathy [facility]    CHF (congestive heart failure) (HCC)    Implantable cardioverter-defibrillator (ICD) in situ    Moderate obstructive sleep apnea    Type 2 diabetes mellitus ([facility])    Class 1 obesity due to excess calories with serious comorbidity in adult    Acute gout involving toe of right foot    Hypokalemia       Past Surgical History:   Procedure Laterality Date    EYE SURGERY         Social History     Socioeconomic History    Marital status: Single     Spouse name: Not on file    Number of children: Not on file    Years of education: Not on file    Highest education level: Not on file   Occupational History     Comment: MD [employer]   Tobacco Use    Smoking status: Never Smoker    Smokeless tobacco: Never Used   Vaping Use    Vaping Use: Never used   Substance and Sexual Activity    Alcohol use: Never    Drug use: Never    Sexual activity: Not Currently   Other Topics Concern    Not on file   Social History Narrative    Lives alone feels safe      Social Determinants of Health     Financial Resource Strain:     Difficulty of Paying Living Expenses: Not on file   Food Insecurity:     Worried About Running Out of Food in the Last Year: Not on file    Marilee of Food in the Last Year: Not on file   Transportation Needs:     Lack of Transportation (Medical): Not on file    Lack of Transportation (Non-Medical):  Not on file   Physical Activity: Inactive    Days of Exercise per Week: 0 days    Minutes of Exercise per Session: 0 min   Stress:     Feeling of Stress : Not on file   Social Connections:     Frequency of Communication with Friends and Family: Not on file    Frequency of Social Gatherings with Friends and Family: Not on file    Attends Mormonism Services: Not on file    Active Member of Clubs or Organizations: Not on file    Attends Club or Organization Meetings: Not on file    Marital Status: Not on file   Intimate Partner Violence: Not At Risk    Fear of Current or Ex-Partner: No    Emotionally Abused: No    Physically Abused: No    Sexually Abused: No   Housing Stability:     Unable to Pay for Housing in the Last Year: Not on file    Number of [place] in the Last Year: Not on file    Unstable Housing in the Last Year: Not on file        Family History   Problem Relation Age of Onset    Diabetes Mother     Diabetes Father     Diabetes Sister        PE  Vitals:    06/24/22 1012 06/24/22 1022   BP: (!) 146/89 129/89   Site: Right Upper Arm Left Upper Arm   Position: Sitting Sitting   Cuff Size: Large Adult Large Adult   Pulse: (!) 112 (!) 103   Temp: 97 °F (36.1 °C)    TempSrc: Temporal    Weight: 232 lb 3.2 oz (105.3 kg)    Height: 5' 11\" (1.803 m)      Estimated body mass index is 32.39 kg/m² as calculated from the following:    Height as of this encounter: 5' 11\" (1.803 m). Weight as of this encounter: 232 lb 3.2 oz (105.3 kg). Physical Exam  Vitals reviewed. Constitutional:       General: He is not in acute distress. Appearance: Normal appearance. He is obese. HENT:      Head: Normocephalic and atraumatic. Eyes:      Extraocular Movements: Extraocular movements intact. Pupils: Pupils are equal, round, and reactive to light. Cardiovascular:      Rate and Rhythm: Normal rate and regular rhythm. Pulses: Normal pulses. Heart sounds: Normal heart sounds. No murmur heard. No gallop. Pulmonary:      Effort: Pulmonary effort is normal.      Breath sounds: Normal breath sounds. No wheezing or rales. Abdominal:      General: Abdomen is flat. Palpations: Abdomen is soft. Musculoskeletal:         General: Swelling and tenderness present. Right lower leg: No edema. Left lower leg: No edema. Skin:     General: Skin is warm and dry. Neurological:      Mental Status: He is alert. Immunization History   Administered Date(s) Administered    Hepatitis B Adult (Engerix-B) 06/24/2022    Td, unspecified formulation 01/09/2013       Health Maintenance   Topic Date Due    COVID-19 Vaccine (1) Never done    Pneumococcal 0-64 years Vaccine (1 - PCV) Never done    Diabetic foot exam  Never done    A1C test (Diabetic or Prediabetic)  Never done    Lipids  Never done    HIV screen  Never done    Diabetic microalbuminuria test  Never done    Diabetic retinal exam  Never done    Hepatitis C screen  Never done    DTaP/Tdap/Td vaccine (1 - Tdap) 01/10/2013    Hepatitis B vaccine (2 of 3 - Risk 3-dose series) 07/22/2022    Flu vaccine (Season Ended) 09/01/2022    Depression Screen  06/24/2023    Hepatitis A vaccine  Aged Out    Hib vaccine  Aged Out    Meningococcal (ACWY) vaccine  Aged Out    Varicella vaccine  Discontinued       PSH, PMH, SH and FH reviewed and noted. Recent and past labs, tests and consults also reviewed. Recent or new meds also reviewed. Manuela Castro DO    This dictation was generated by voice recognition computer software. Although all attempts are made to edit the dictation for accuracy, there may be errors in the transcription that are not intended.

## 2022-06-24 NOTE — PATIENT INSTRUCTIONS
Start Jardiance 10 mg each am for your diabetes as we discussed on the phone today.  Please be sure to drink plenty of water daily.  No change in your insulin doses or other diabetic medications today.  I will call you in 2 months ( early April 2021) for follow up.  Sincerely,  Cecile Juarez PA-C      We appreciate your assistance in coordinating your healthcare.     Please upload your insulin pump, blood sugar meter and/or continuous glucose monitor at home 1-2 days before your next diabetes-related appointment.   This will allow your provider to review your  data before your scheduled virtual visit.    To ask a question to your Endocrine care team, please send them a [employer] message, or reach them by phone at [phone]     To expedite your medication refill(s), please contact your pharmacy and have them   fax a refill request to: [phone].  *Please allow 3 business days for routine medication refills.  *Please allow 5 business days for controlled substance medication refills.    For after-hours urgent Endocrine issues, that do not require 911, please dial [phone], and ask to speak with the Endocrinologist On-Call           alcohol.  Losing weight, if you are overweight, may help reduce attacks of gout. But do not go on a diet that causes rapid weight loss. Losing a lot of weight in a short amount of time can cause a gout attack. When should you call for help? Call your doctor now or seek immediate medical care if:     You have a fever.      The joint is so painful you cannot use it.      You have sudden, unexplained swelling, redness, warmth, or severe pain in one or more joints. Watch closely for changes in your health, and be sure to contact your doctor if:     You have joint pain.      Your symptoms get worse or are not improving after 2 or 3 days. Where can you learn more? Go to https://Ooploo.Currently. org and sign in to your [employer] account. Enter Y978 in the [employer] box to learn more about \"Gout: Care Instructions. \"     If you do not have an account, please click on the \"Sign Up Now\" link. Current as of: December 20, 2021               Content Version: 13.3  © 2006-2022 Healthwise, Incorporated. Care instructions adapted under license by [facility] ([facility]). If you have questions about a medical condition or this instruction, always ask your healthcare professional. [place] [address] any warranty or liability for your use of this information.

## 2022-06-25 LAB
A/G RATIO: 1.2 (ref 1.1–2.2)
ALBUMIN SERPL-MCNC: 4.3 G/DL (ref 3.4–5)
ALP BLD-CCNC: 126 U/L (ref 40–129)
ALT SERPL-CCNC: 16 U/L (ref 10–40)
ANION GAP SERPL CALCULATED.3IONS-SCNC: 14 MMOL/L (ref 3–16)
AST SERPL-CCNC: 14 U/L (ref 15–37)
BILIRUB SERPL-MCNC: 0.7 MG/DL (ref 0–1)
BUN BLDV-MCNC: 33 MG/DL (ref 7–20)
C-REACTIVE PROTEIN: 17.9 MG/L (ref 0–5.1)
CALCIUM SERPL-MCNC: 10.1 MG/DL (ref 8.3–10.6)
CHLORIDE BLD-SCNC: 100 MMOL/L (ref 99–110)
CHOLESTEROL, TOTAL: 157 MG/DL (ref 0–199)
CO2: 26 MMOL/L (ref 21–32)
CREAT SERPL-MCNC: 3.2 MG/DL (ref 0.9–1.3)
ESTIMATED AVERAGE GLUCOSE: 142.7 MG/DL
GFR AFRICAN AMERICAN: 26
GFR NON-AFRICAN AMERICAN: 22
GLUCOSE BLD-MCNC: 119 MG/DL (ref 70–99)
HBA1C MFR BLD: 6.6 %
HDLC SERPL-MCNC: 49 MG/DL (ref 40–60)
HEPATITIS C ANTIBODY INTERPRETATION: NORMAL
HIV AG/AB: NORMAL
HIV ANTIGEN: NORMAL
HIV-1 ANTIBODY: NORMAL
HIV-2 AB: NORMAL
LDL CHOLESTEROL CALCULATED: 87 MG/DL
POTASSIUM SERPL-SCNC: 4.4 MMOL/L (ref 3.5–5.1)
SEDIMENTATION RATE, ERYTHROCYTE: 115 MM/HR (ref 0–15)
SODIUM BLD-SCNC: 140 MMOL/L (ref 136–145)
TOTAL PROTEIN: 7.9 G/DL (ref 6.4–8.2)
TRIGL SERPL-MCNC: 105 MG/DL (ref 0–150)
URIC ACID, SERUM: 10.4 MG/DL (ref 3.5–7.2)
VLDLC SERPL CALC-MCNC: 21 MG/DL

## 2022-06-28 DIAGNOSIS — N19 RENAL FAILURE, UNSPECIFIED CHRONICITY: Primary | ICD-10-CM

## 2022-06-28 NOTE — RESULT ENCOUNTER NOTE
Attempted to call pt I received the vm. I left the pt a message and I will be calling him back today.

## 2022-06-28 NOTE — RESULT ENCOUNTER NOTE
Spoke with pt and informed him, I also scheduled the nolvia for him his appointment is Friday 7/1/2022 at 315, I left the pt a vm informing him.

## 2022-07-08 ENCOUNTER — OFFICE VISIT (OUTPATIENT)
Dept: PRIMARY CARE CLINIC | Age: 42
End: 2022-07-08
Payer: COMMERCIAL

## 2022-07-08 VITALS
BODY MASS INDEX: 32.11 KG/M2 | WEIGHT: 229.4 LBS | DIASTOLIC BLOOD PRESSURE: 82 MMHG | HEIGHT: 71 IN | HEART RATE: 85 BPM | TEMPERATURE: 97 F | SYSTOLIC BLOOD PRESSURE: 138 MMHG

## 2022-07-08 DIAGNOSIS — I10 BENIGN ESSENTIAL HYPERTENSION: ICD-10-CM

## 2022-07-08 DIAGNOSIS — M1A.0790 CHRONIC IDIOPATHIC GOUT INVOLVING TOE WITHOUT TOPHUS, UNSPECIFIED LATERALITY: Primary | ICD-10-CM

## 2022-07-08 DIAGNOSIS — E11.22 TYPE 2 DIABETES MELLITUS WITH CHRONIC KIDNEY DISEASE, WITHOUT LONG-TERM CURRENT USE OF INSULIN, UNSPECIFIED CKD STAGE (HCC): ICD-10-CM

## 2022-07-08 DIAGNOSIS — E66.09 CLASS 1 OBESITY DUE TO EXCESS CALORIES WITH SERIOUS COMORBIDITY IN ADULT, UNSPECIFIED BMI: ICD-10-CM

## 2022-07-08 DIAGNOSIS — N18.4 CKD (CHRONIC KIDNEY DISEASE) STAGE 4, GFR 15-29 ML/MIN (HCC): ICD-10-CM

## 2022-07-08 PROBLEM — M10.9 ACUTE GOUT INVOLVING TOE OF RIGHT FOOT: Status: RESOLVED | Noted: 2022-06-24 | Resolved: 2022-07-08

## 2022-07-08 LAB
ALBUMIN SERPL-MCNC: 4.8 G/DL (ref 3.4–5)
ANION GAP SERPL CALCULATED.3IONS-SCNC: 17 MMOL/L (ref 3–16)
BUN BLDV-MCNC: 58 MG/DL (ref 7–20)
CALCIUM SERPL-MCNC: 9.6 MG/DL (ref 8.3–10.6)
CHLORIDE BLD-SCNC: 97 MMOL/L (ref 99–110)
CO2: 25 MMOL/L (ref 21–32)
CREAT SERPL-MCNC: 3.2 MG/DL (ref 0.9–1.3)
CREATININE URINE: 62.6 MG/DL (ref 39–259)
GFR AFRICAN AMERICAN: 26
GFR NON-AFRICAN AMERICAN: 22
GLUCOSE BLD-MCNC: 102 MG/DL (ref 70–99)
MICROALBUMIN UR-MCNC: 3.7 MG/DL
MICROALBUMIN/CREAT UR-RTO: 59.1 MG/G (ref 0–30)
PHOSPHORUS: 4.6 MG/DL (ref 2.5–4.9)
POTASSIUM SERPL-SCNC: 3.6 MMOL/L (ref 3.5–5.1)
PRO-BNP: 1313 PG/ML (ref 0–124)
SODIUM BLD-SCNC: 139 MMOL/L (ref 136–145)
URIC ACID, SERUM: 12.9 MG/DL (ref 3.5–7.2)

## 2022-07-08 PROCEDURE — 99214 OFFICE O/P EST MOD 30 MIN: CPT | Performed by: STUDENT IN AN ORGANIZED HEALTH CARE EDUCATION/TRAINING PROGRAM

## 2022-07-08 PROCEDURE — 3044F HG A1C LEVEL LT 7.0%: CPT | Performed by: STUDENT IN AN ORGANIZED HEALTH CARE EDUCATION/TRAINING PROGRAM

## 2022-07-08 RX ORDER — ALLOPURINOL 100 MG/1
100 TABLET ORAL DAILY
Qty: 30 TABLET | Refills: 11 | Status: SHIPPED | OUTPATIENT
Start: 2022-07-08 | End: 2022-07-22

## 2022-07-08 ASSESSMENT — ENCOUNTER SYMPTOMS
NAUSEA: 0
WHEEZING: 0
SHORTNESS OF BREATH: 0

## 2022-07-08 NOTE — PATIENT INSTRUCTIONS
Get your COVID vaccine! Patient Education        Purine-Restricted Diet: Care Instructions  Your Care Instructions     Purines are substances that are found in some foods. Your body turns purines into uric acid. High levels of uric acid can cause gout, which is a form ofarthritis that causes pain and inflammation in joints. You may be able to help control the amount of uric acid in your body bylimiting high-purine foods in your diet. Follow-up care is a key part of your treatment and safety. Be sure to make and go to all appointments, and call your doctor if you are having problems. It's also a good idea to know your test results and keep alist of the medicines you take. How can you care for yourself at home?  Plan your meals and snacks around foods that are low in purines and are safe for you to eat. These foods include:  ? Green vegetables and tomatoes. ? Fruits. ? Whole-grain breads, rice, and cereals. ? Eggs, peanut butter, and nuts. ? Low-fat milk, cheese, and other milk products. ? Popcorn. ? Gelatin desserts, chocolate, cocoa, and cakes and sweets, in small amounts.  You can eat certain foods that are medium-high in purines, but eat them only once in a while. These foods include:  ? Legumes, such as dried beans and dried peas. You can have 1 cup cooked legumes each day. ? Asparagus, cauliflower, spinach, mushrooms, and green peas. ? Fish and seafood (other than very high-purine seafood). ? Oatmeal, wheat bran, and wheat germ.  Limit very high-purine foods, including:  ? Organ meats, such as liver, kidneys, sweetbreads, and brains. ? Meats, including hinojosa, beef, pork, and lamb. ? Game meats and any other meats in large amounts. ? Anchovies, sardines, herring, mackerel, and scallops. ? Gravy. ? Beer. Where can you learn more? Go to https://devonte.Appsco. org and sign in to your [employer] account.  Enter F448 in the [facility] box to learn more about

## 2022-07-08 NOTE — PROGRESS NOTES
[facility]  2022    Tasia iVcente (:  1980) is a 46 y.o. male, here for evaluation of the following medical concerns:    Chief Complaint   Patient presents with    Follow-up     gout diabetes         ASSESSMENT/ PLAN  1. Chronic idiopathic gout involving toe without tophus, unspecified laterality  Controlled, initiate allopurinol preventative. Recheck urate level. Low purine diet strongly recommended. - allopurinol (ZYLOPRIM) 100 MG tablet; Take 1 tablet by mouth daily  Dispense: 30 tablet; Refill: 11    2. Benign essential hypertension  Controlled, continue carvedilol. 3. Type 2 diabetes mellitus with chronic kidney disease, without long-term current use of insulin, unspecified CKD stage (HCC)  A1c 6.6. We will continue to monitor every 3 months, currently doing well off of antihyperglycemics. Would not recommend initiating metformin at this time due to CKD severity. Records request for eye exam.  Urine microalbumin today. Continue Lipitor. 4. Class 1 obesity due to excess calories with serious comorbidity in adult, unspecified BMI  Complicates all aspects of patient's care. He is working on weight loss with significant lifestyle changes. Return in about 3 months (around 10/8/2022) for diabetes follow up. HPI  Patient presents today for follow-up on hypertension, type 2 diabetes, gout, obesity. He notes that his toe and ankle pain related to gout flare have resolved since completing prednisone taper. He has had multiple attacks in the past, and has not been on prophylactic medication. He is aware of a low purine diet, and plans to adhere to it from here on out. Blood pressure has been well controlled with carvedilol. He is also taking Bumex for heart failure. He is due to follow up with cardiology in 2 weeks. Patient's last A1c is 6.6. He is currently not taking antihyperglycemic medications, and has been seen by nephrology for CKD.   He notes that he has been following a low carbohydrate diet and returned to exercising regularly. Last diabetic eye exam was 6 months ago prior to his move to [place], which was normal.    ROS  Review of Systems   Constitutional: Negative for activity change and unexpected weight change. HENT: Negative for tinnitus. Eyes: Negative for visual disturbance. Respiratory: Negative for shortness of breath and wheezing. Cardiovascular: Negative for chest pain, palpitations and leg swelling. Gastrointestinal: Negative for nausea. Genitourinary: Negative for decreased urine volume. Neurological: Negative for syncope, light-headedness and headaches. HISTORIES  Current Outpatient Medications on File Prior to Visit   Medication Sig Dispense Refill    atorvastatin (LIPITOR) 10 MG tablet Take 10 mg by mouth daily      carvedilol (COREG) 3.125 MG tablet TAKE 1 TABLET BY MOUTH TWICE DAILY      predniSONE (DELTASONE) 10 MG tablet Take 4 tablets by mouth daily for 4 days, THEN 3 tablets daily for 4 days, THEN 2 tablets daily for 4 days, THEN 1 tablet daily for 4 days. 40 tablet 0    bumetanide (BUMEX) 1 MG tablet Take 1 tablet by mouth daily 30 tablet 11    potassium chloride (KLOR-CON M) 10 MEQ extended release tablet Take 1 tablet by mouth daily 30 tablet 11     No current facility-administered medications on file prior to visit.       Past Medical History:   Diagnosis Date    Arthritis     Congestive heart failure (CHF) (HCC)     Diabetes ([facility]     Gout     HTN (hypertension)      Patient Active Problem List   Diagnosis    Primary osteoarthritis of both knees    Benign essential hypertension    Cardiomyopathy ([facility]    CHF (congestive heart failure) ([facility]    Implantable cardioverter-defibrillator (ICD) in situ    Moderate obstructive sleep apnea    Type 2 diabetes mellitus (HCC)    Class 1 obesity due to excess calories with serious comorbidity in adult    Hypokalemia    Chronic idiopathic gout involving toe without tophus       PE  Vitals:    07/08/22 0956 07/08/22 1012   BP: (!) 137/94 138/82   Site: Left Upper Arm Left Upper Arm   Position: Sitting Sitting   Cuff Size: Large Adult Large Adult   Pulse: 94 85   Temp: 97 °F (36.1 °C)    TempSrc: Temporal    Weight: 229 lb 6.4 oz (104.1 kg)    Height: 5' 11\" (1.803 m)      Estimated body mass index is 31.99 kg/m² as calculated from the following:    Height as of this encounter: 5' 11\" (1.803 m). Weight as of this encounter: 229 lb 6.4 oz (104.1 kg). Physical Exam  Vitals reviewed. Constitutional:       General: He is not in acute distress. Appearance: Normal appearance. He is obese. HENT:      Head: Normocephalic and atraumatic. Eyes:      Extraocular Movements: Extraocular movements intact. Pupils: Pupils are equal, round, and reactive to light. Cardiovascular:      Rate and Rhythm: Normal rate and regular rhythm. Pulses: Normal pulses. Heart sounds: Normal heart sounds. No murmur heard. No gallop. Pulmonary:      Effort: Pulmonary effort is normal.      Breath sounds: Normal breath sounds. No wheezing or rales. Abdominal:      General: Abdomen is flat. Palpations: Abdomen is soft. Musculoskeletal:      Right lower leg: No edema. Left lower leg: No edema. Skin:     General: Skin is warm and dry. Neurological:      Mental Status: He is alert. Rosibel Lyle DO    This dictation was generated by voice recognition computer software. Although all attempts are made to edit the dictation for accuracy, there may be errors in the transcription that are not intended.

## 2022-07-09 LAB
KAPPA, FREE LIGHT CHAINS, SERUM: 44.54 MG/L (ref 3.3–19.4)
KAPPA/LAMBDA RATIO: 2.27 (ref 0.26–1.65)
KAPPA/LAMBDA TEST COMMENT: ABNORMAL
LAMBDA, FREE LIGHT CHAINS, SERUM: 19.64 MG/L (ref 5.71–26.3)

## 2022-07-12 ENCOUNTER — HOSPITAL ENCOUNTER (EMERGENCY)
Age: 42
Discharge: HOME | End: 2022-07-12
Payer: COMMERCIAL

## 2022-07-12 VITALS
HEIGHT: 71 IN | WEIGHT: 228.4 LBS | DIASTOLIC BLOOD PRESSURE: 90 MMHG | TEMPERATURE: 98.1 F | BODY MASS INDEX: 31.98 KG/M2 | SYSTOLIC BLOOD PRESSURE: 123 MMHG | HEART RATE: 74 BPM | RESPIRATION RATE: 18 BRPM | OXYGEN SATURATION: 99 %

## 2022-07-12 DIAGNOSIS — Z20.822 COVID-19 VIRUS TEST RESULT UNKNOWN: ICD-10-CM

## 2022-07-12 DIAGNOSIS — R68.89 FLU-LIKE SYMPTOMS: Primary | ICD-10-CM

## 2022-07-12 PROCEDURE — U0005 INFEC AGEN DETEC AMPLI PROBE: HCPCS

## 2022-07-12 PROCEDURE — U0003 INFECTIOUS AGENT DETECTION BY NUCLEIC ACID (DNA OR RNA); SEVERE ACUTE RESPIRATORY SYNDROME CORONAVIRUS 2 (SARS-COV-2) (CORONAVIRUS DISEASE [COVID-19]), AMPLIFIED PROBE TECHNIQUE, MAKING USE OF HIGH THROUGHPUT TECHNOLOGIES AS DESCRIBED BY CMS-2020-01-R: HCPCS

## 2022-07-12 PROCEDURE — 99283 EMERGENCY DEPT VISIT LOW MDM: CPT

## 2022-07-12 ASSESSMENT — PAIN - FUNCTIONAL ASSESSMENT
PAIN_FUNCTIONAL_ASSESSMENT: ACTIVITIES ARE NOT PREVENTED
PAIN_FUNCTIONAL_ASSESSMENT: ACTIVITIES ARE NOT PREVENTED
PAIN_FUNCTIONAL_ASSESSMENT: 0-10
PAIN_FUNCTIONAL_ASSESSMENT: 0-10

## 2022-07-12 ASSESSMENT — PAIN DESCRIPTION - PAIN TYPE
TYPE: ACUTE PAIN
TYPE: ACUTE PAIN

## 2022-07-12 ASSESSMENT — PAIN SCALES - GENERAL
PAINLEVEL_OUTOF10: 5
PAINLEVEL_OUTOF10: 5

## 2022-07-12 ASSESSMENT — PAIN DESCRIPTION - DESCRIPTORS
DESCRIPTORS: ACHING
DESCRIPTORS: ACHING

## 2022-07-12 ASSESSMENT — ENCOUNTER SYMPTOMS
RHINORRHEA: 1
GASTROINTESTINAL NEGATIVE: 1
SHORTNESS OF BREATH: 0
COUGH: 0

## 2022-07-12 ASSESSMENT — PAIN DESCRIPTION - ONSET
ONSET: ON-GOING
ONSET: ON-GOING

## 2022-07-12 ASSESSMENT — PAIN DESCRIPTION - LOCATION
LOCATION: GENERALIZED
LOCATION: GENERALIZED

## 2022-07-12 NOTE — ED PROVIDER NOTES
[address] [facility] [address]  Dept: [address]: [phone]  eMERGENCYdEPARTMENT eNCOUnter      Pt Name: Tiago Peoples  MRN: [id number]  José Miguelgfsylvain 1980  Date of evaluation: 7/12/2022  Provider:Emma Mike PA-C    CHIEF COMPLAINT     No chief complaint on file. CRITICAL CARE TIME   Total Critical Care time was 0 minutes, excluding separately reportable procedures. There was a high probability of clinically significant/life threatening deterioration in the patient's condition which required my urgentintervention. HISTORY OF PRESENT ILLNESS  (Location/Symptom, Timing/Onset, Context/Setting, Quality, Duration,Modifying Factors, Severity.)   Tiago Peoples is a 46 y.o. male who presents to the emergency department by private vehicle requesting COVID-19 testing. Patient states Sunday he began with fever, chills and cold symptoms. He has been taking over-the-counter medications since with symptomatic relief. He is here to be tested for COVID-19, as he does not want to pass infection on to family members. He denies any chest pain, shortness of breath. No other complaints this time. No known sick contacts. Has not received COVID-19 vaccine. Nursing Notes were reviewedand agreed with or any disagreements were addressed in the HPI. REVIEW OF SYSTEMS    (2-9 systems for level 4, 10 or more for level 5)     Review of Systems   Constitutional: Positive for fever. Negative for chills. HENT: Positive for congestion and rhinorrhea. Respiratory: Negative for cough and shortness of breath. Cardiovascular: Negative for chest pain. Gastrointestinal: Negative. Genitourinary: Negative. Musculoskeletal: Negative for arthralgias and myalgias. Neurological: Positive for headaches. Psychiatric/Behavioral: Negative for behavioral problems and confusion.        Except as noted above the remainder of the review of systems was reviewed and negative. PAST MEDICAL HISTORY         Diagnosis Date    Arthritis     [facility] failure ([facility]F) ([facility] [address] [facility]     Diabetes ([facility] [address] [facility]     Gout     HTN (hypertension)        SURGICAL HISTORY           Procedure Laterality Date    EYE SURGERY         CURRENT MEDICATIONS     [unfilled]    ALLERGIES     Eucalyptus oil    FAMILY HISTORY           Problem Relation Age of Onset    Diabetes Mother     Diabetes Father     Diabetes Sister      Family Status   Relation Name Status    Mother  Alive    Father  Alive    Sister  Alive        SOCIAL HISTORY      reports that he has never smoked. He has never used smokeless tobacco. He reports that he does not drink alcohol and does not use drugs. PHYSICAL EXAM    (up to 7 for level 4, 8 or more for level 5)     ED Triage Vitals [07/12/22 1927]   Enc Vitals Group      BP (!) 123/90      Heart Rate 74      Resp 18      Temp 98.1 °F (36.7 °C)      Temp Source Oral      SpO2 99 %      Weight 228 lb 6.3 oz (103.6 kg)      Height 5' 11\" (1.803 m)      Head Circumference       Peak Flow       Pain Score       Pain Loc       Pain Edu? Excl. in [address]? Physical Exam  Vitals reviewed. Constitutional:       Appearance: Normal appearance. HENT:      Head: Normocephalic and atraumatic. Mouth/Throat:      Mouth: Mucous membranes are moist.      Pharynx: No oropharyngeal exudate or posterior oropharyngeal erythema. Cardiovascular:      Rate and Rhythm: Normal rate and regular rhythm. Pulmonary:      Effort: Pulmonary effort is normal. No respiratory distress. Breath sounds: Normal breath sounds. No stridor. No wheezing, rhonchi or rales. Musculoskeletal:         General: Normal range of motion. Cervical back: Normal range of motion and neck supple. No rigidity. Skin:     General: Skin is warm. Neurological:      General: No focal deficit present.       Mental Status: He is alert and oriented to person, place, and time. Psychiatric:         Mood and Affect: Mood normal.         Behavior: Behavior normal.           DIAGNOSTIC RESULTS     EKG: All EKG's are interpreted by the Emergency Department Physician who either signs or Co-signs this chart in the absence of a cardiologist.    RADIOLOGY:   Non-plain film images such as CT, Ultrasound and MRI are read by the radiologist. Plain radiographic images are visualized and preliminarilyinterpreted by the emergency physician with the below findings:    Interpretation per the Radiologist below,if available at the time of this note:    No orders to display         LABS:  Labs Reviewed   COVID-19   COVID-19   COVID-19   COVID-19       All other labs were within normal range or not returned as of this dictation. EMERGENCY DEPARTMENT COURSE and DIFFERENTIAL DIAGNOSIS/MDM:   Vitals:    Vitals:    07/12/22 1927   BP: (!) 123/90   Pulse: 74   Resp: 18   Temp: 98.1 °F (36.7 °C)   TempSrc: Oral   SpO2: 99%   Weight: 228 lb 6.3 oz (103.6 kg)   Height: 5' 11\" (1.803 m)       MDM     Patient presents to the ED with HPI noted above. Patient requesting ZLCPO-65 testing given cold symptoms. He wants to sure he does not have COVID and pass on to family members. Physical exam as above. Lungs clear to auscultation throughout. COVID PCR obtained and pending. Patient will isolate pending test results. Patient has been taking over-the-counter medication with symptomatic relief. No additional medications indicated this time. Return precaution discussed. He was discharged home in stable condition. The patient tolerated their visit well. I saw the patient independently with physician available for consultation as needed. I have discussed the findings of today's workup with the patient and addressed the patient's questions and concerns. Important warning signs as well as new or worsening symptoms which would necessitate immediate return to the ED were discussed.  The plan is to

## 2022-07-13 LAB — SARS-COV-2: DETECTED

## 2022-07-14 NOTE — RESULT ENCOUNTER NOTE
Patient's positive result has been appropriately evaluated by the provider pool. Patient was unable to be reached over the phone. A voicemail was left with the patient. Will await a return phone call.     Will send a letter to patient

## 2022-07-14 NOTE — RESULT ENCOUNTER NOTE
Patient's positive result has been appropriately evaluated by the provider pool.  Patient see results in my chart

## 2022-07-16 DIAGNOSIS — M10.9 ACUTE GOUT INVOLVING TOE OF RIGHT FOOT, UNSPECIFIED CAUSE: ICD-10-CM

## 2022-07-18 RX ORDER — PREDNISONE 10 MG/1
TABLET ORAL
Qty: 40 TABLET | Refills: 0 | OUTPATIENT
Start: 2022-07-18

## 2022-07-18 NOTE — TELEPHONE ENCOUNTER
Medication:   Requested Prescriptions     Pending Prescriptions Disp Refills    predniSONE (DELTASONE) 10 MG tablet [Pharmacy Med Name: predniSONE 10 MG Oral Tablet] 40 tablet 0     Sig: TAKE 4 TABLETS BY MOUTH ONCE DAILY FOR 4 DAYS AND 3 ONCE DAILY FOR 4 DAYS AND 2 ONCE DAILY FOR 4 DAYS AND 1 ONCE DAILY FOR 4 DAYS       Last Filled:  6/24/2022    Patient Phone Number: [phone] (home) [phone] (work)    Last appt: 7/8/2022   Next appt: 10/14/2022    Last Labs DM:   Lab Results   Component Value Date/Time    LABA1C 6.6 06/24/2022 11:45 AM       Return in about 3 months (around 10/8/2022) for diabetes follow up.

## 2022-07-20 ENCOUNTER — TELEPHONE (OUTPATIENT)
Dept: PRIMARY CARE CLINIC | Age: 42
End: 2022-07-20

## 2022-07-20 NOTE — TELEPHONE ENCOUNTER
Pt said he was on prednisone pack and it helped his gout . Now that he hasn't had it his gout is flared back up and having issues walking. Would like to know if he can get more.

## 2022-07-21 ENCOUNTER — OFFICE VISIT (OUTPATIENT)
Dept: PRIMARY CARE CLINIC | Age: 42
End: 2022-07-21
Payer: COMMERCIAL

## 2022-07-21 VITALS
WEIGHT: 225 LBS | DIASTOLIC BLOOD PRESSURE: 80 MMHG | HEART RATE: 107 BPM | SYSTOLIC BLOOD PRESSURE: 124 MMHG | TEMPERATURE: 97 F | BODY MASS INDEX: 31.5 KG/M2 | HEIGHT: 71 IN

## 2022-07-21 DIAGNOSIS — N18.30 STAGE 3 CHRONIC KIDNEY DISEASE, UNSPECIFIED WHETHER STAGE 3A OR 3B CKD (HCC): ICD-10-CM

## 2022-07-21 DIAGNOSIS — M1A.0710 CHRONIC IDIOPATHIC GOUT INVOLVING TOE OF RIGHT FOOT WITHOUT TOPHUS: ICD-10-CM

## 2022-07-21 DIAGNOSIS — E11.69 TYPE 2 DIABETES MELLITUS WITH OTHER SPECIFIED COMPLICATION, WITHOUT LONG-TERM CURRENT USE OF INSULIN (HCC): ICD-10-CM

## 2022-07-21 DIAGNOSIS — M10.371 ACUTE GOUT DUE TO RENAL IMPAIRMENT INVOLVING RIGHT ANKLE: Primary | ICD-10-CM

## 2022-07-21 PROCEDURE — 99214 OFFICE O/P EST MOD 30 MIN: CPT | Performed by: STUDENT IN AN ORGANIZED HEALTH CARE EDUCATION/TRAINING PROGRAM

## 2022-07-21 RX ORDER — PREDNISONE 10 MG/1
TABLET ORAL
Qty: 40 TABLET | Refills: 0 | Status: SHIPPED | OUTPATIENT
Start: 2022-07-21 | End: 2022-08-06

## 2022-07-21 ASSESSMENT — ENCOUNTER SYMPTOMS
SHORTNESS OF BREATH: 0
WHEEZING: 0
NAUSEA: 0

## 2022-07-21 NOTE — PROGRESS NOTES
[facility]  2022    Najma Wheat (:  1980) is a 46 y.o. male, here for evaluation of the following medical concerns:    Chief Complaint   Patient presents with    Other     Gout flare up would like meds , now bothering his knee, it hurts to bend         ASSESSMENT/ PLAN  1. Acute gout due to renal impairment involving right ankle  Uncontrolled, recurrent. Adherent to allopurinol and low purine diet, complicated by chronic kidney failure. Prescribed second prednisone taper, recheck uric acid level and will likely need higher dose of allopurinol. Continue to avoid NSAIDs  - predniSONE (DELTASONE) 10 MG tablet; Take 4 tablets by mouth daily for 4 days, THEN 3 tablets daily for 4 days, THEN 2 tablets daily for 4 days, THEN 1 tablet daily for 4 days. Dispense: 40 tablet; Refill: 0    2. Chronic idiopathic gout involving toe of right foot without tophus  Per above  - Uric Acid; Future    3. Stage 3 chronic kidney disease, unspecified whether stage 3a or 3b CKD ([facility] [address]  Controlled, continue to monitor    Return in about 3 months (around 10/14/2022) for DM, CKD, gout. HPI  Patient presents today with concerns of gouty flare. He has finished prednisone taper, which resolved his right toe and ankle gout. States that he has been adherent with his allopurinol, however experienced recurrence of gout flare over the past couple days. Denies high purine foods. No foot or ankle injury. Unable to take NSAIDs secondary to CKD. ROS  Review of Systems   Constitutional:  Negative for activity change and unexpected weight change. HENT:  Negative for tinnitus. Eyes:  Negative for visual disturbance. Respiratory:  Negative for shortness of breath and wheezing. Cardiovascular:  Negative for chest pain, palpitations and leg swelling. Gastrointestinal:  Negative for nausea. Genitourinary:  Negative for decreased urine volume.    Neurological:  Negative for syncope, light-headedness and Rhythm: Normal rate and regular rhythm. Pulses: Normal pulses. Heart sounds: Normal heart sounds. No murmur heard. No gallop. Pulmonary:      Effort: Pulmonary effort is normal.      Breath sounds: Normal breath sounds. No wheezing or rales. Abdominal:      General: Abdomen is flat. Palpations: Abdomen is soft. Musculoskeletal:         General: Swelling and tenderness present. Right lower leg: No edema. Left lower leg: No edema. Comments: Patient is wearing a orthopedic walking boot, pain and swelling right ankle and first metatarsal   Skin:     General: Skin is warm and dry. Neurological:      Mental Status: He is alert. Oliver Pete DO    This dictation was generated by voice recognition computer software. Although all attempts are made to edit the dictation for accuracy, there may be errors in the transcription that are not intended.

## 2022-07-22 DIAGNOSIS — M1A.0710 CHRONIC IDIOPATHIC GOUT INVOLVING TOE OF RIGHT FOOT WITHOUT TOPHUS: Primary | ICD-10-CM

## 2022-07-22 LAB
CREATININE URINE: 181.2 MG/DL (ref 39–259)
MICROALBUMIN UR-MCNC: 3 MG/DL
MICROALBUMIN/CREAT UR-RTO: 16.6 MG/G (ref 0–30)
URIC ACID, SERUM: 8.6 MG/DL (ref 3.5–7.2)

## 2022-07-22 RX ORDER — ALLOPURINOL 300 MG/1
300 TABLET ORAL DAILY
Qty: 30 TABLET | Refills: 11 | Status: SHIPPED | OUTPATIENT
Start: 2022-07-22

## 2022-09-05 DIAGNOSIS — M10.9 ACUTE GOUT INVOLVING TOE OF RIGHT FOOT, UNSPECIFIED CAUSE: ICD-10-CM

## 2022-09-06 NOTE — TELEPHONE ENCOUNTER
Medication:   Requested Prescriptions     Pending Prescriptions Disp Refills    predniSONE (DELTASONE) 10 MG tablet [Pharmacy Med Name: predniSONE 10 MG Oral Tablet] 40 tablet 3     Sig: TAKE 4 TABLETS BY MOUTH ONCE DAILY FOR 4 DAYS AND 3 ONCE DAILY FOR 4 DAYS AND 2 ONCE DAILY FOR 4 DAYS AND 1 ONCE DAILY FOR 4 DAYS       Last Filled:      Patient Phone Number: [phone] (home) [phone] (work)    Last appt: 7/21/2022   Next appt: 10/14/2022  Return in about 3 months (around 10/14/2022) for DM, CKD, gout.   Last PSA: No results found for: PSA

## 2022-09-07 RX ORDER — PREDNISONE 10 MG/1
TABLET ORAL
Qty: 40 TABLET | Refills: 3 | OUTPATIENT
Start: 2022-09-07

## 2023-01-19 LAB — LEFT VENTRICULAR EJECTION FRACTION, EXTERNAL: 30

## 2023-07-22 DIAGNOSIS — M1A.0710 CHRONIC IDIOPATHIC GOUT INVOLVING TOE OF RIGHT FOOT WITHOUT TOPHUS: ICD-10-CM

## 2023-07-24 RX ORDER — ALLOPURINOL 300 MG/1
300 TABLET ORAL DAILY
Qty: 30 TABLET | Refills: 11 | OUTPATIENT
Start: 2023-07-24

## 2023-07-24 NOTE — TELEPHONE ENCOUNTER
Has not been seen in over a year so pt needs an appointment, but he also has been seeing another PCP at a different office between visits. Medication:   Requested Prescriptions     Pending Prescriptions Disp Refills    allopurinol (ZYLOPRIM) 300 MG tablet [Pharmacy Med Name: ALLOPURINOL 300MG TABLETS] 30 tablet 11     Sig: TAKE 1 TABLET BY MOUTH IN THE MORNING        Last Filled:    7/22/22  Patient Phone Number: [phone] (home) [phone] (work)    Last appt: 7/21/2022   Next appt: Visit date not found    Last OARRS: No flowsheet data found.